# Patient Record
Sex: MALE | NOT HISPANIC OR LATINO | URBAN - METROPOLITAN AREA
[De-identification: names, ages, dates, MRNs, and addresses within clinical notes are randomized per-mention and may not be internally consistent; named-entity substitution may affect disease eponyms.]

---

## 2021-02-05 ENCOUNTER — COMMUNICATION - HEALTHEAST (OUTPATIENT)
Dept: CARDIOLOGY | Facility: CLINIC | Age: 61
End: 2021-02-05

## 2021-02-08 ENCOUNTER — OFFICE VISIT - HEALTHEAST (OUTPATIENT)
Dept: CARDIOLOGY | Facility: CLINIC | Age: 61
End: 2021-02-08

## 2021-02-08 DIAGNOSIS — R00.2 PALPITATIONS: ICD-10-CM

## 2021-02-08 DIAGNOSIS — I49.8 SINUS ARRHYTHMIA SEEN ON ELECTROCARDIOGRAM: ICD-10-CM

## 2021-02-08 RX ORDER — LISINOPRIL 10 MG/1
5 TABLET ORAL DAILY
Status: SHIPPED | COMMUNITY
Start: 2021-02-08

## 2021-02-08 ASSESSMENT — MIFFLIN-ST. JEOR: SCORE: 1715.72

## 2021-06-05 VITALS
SYSTOLIC BLOOD PRESSURE: 134 MMHG | HEART RATE: 82 BPM | WEIGHT: 197 LBS | DIASTOLIC BLOOD PRESSURE: 64 MMHG | HEIGHT: 71 IN | RESPIRATION RATE: 16 BRPM | BODY MASS INDEX: 27.58 KG/M2

## 2021-06-18 NOTE — PATIENT INSTRUCTIONS - HE
Patient Instructions by Lalito Sheehan MD at 2/8/2021  8:50 AM     Author: Lalito Sheehan MD Service: -- Author Type: Physician    Filed: 2/8/2021  9:28 AM Encounter Date: 2/8/2021 Status: Signed    : Lalito Sheehan MD (Physician)       It was a pleasure to meet with you today.      Below is a summary of your visit.   1. Set up [facility] so you can send me a copy of your echocardiogram from last December.  2. Monitor for continued symptoms. We don't need to do anything more at this time, but can evaluate further if your symptoms persist.  3. You can consider taking a magnesium supplement to help suppress extra heartbeats in your heart rhythm. Try Slow Mag, 1-2 tablets daily.  4. Follow up with me as needed with new or worsening symptoms.    Please do not hesitate to call the [facility] clinic with any questions or concerns at [phone]. You can also reach my nurse, Brianda, during normal business hours at [phone].    Sincerely,

## 2021-06-26 ENCOUNTER — HEALTH MAINTENANCE LETTER (OUTPATIENT)
Age: 61
End: 2021-06-26

## 2021-06-30 NOTE — PROGRESS NOTES
Progress Notes by Lalito Sheehan MD at 2/8/2021  8:50 AM     Author: Lalito Sheehan MD Service: -- Author Type: Physician    Filed: 2/8/2021  9:35 AM Encounter Date: 2/8/2021 Status: Signed    : Lalito Sheehan MD (Physician)           Thank you, DrKacie Provider, [facility], for asking the [facility] team to see Mr. Rj Delong to evaluate Palpitations .      Assessment/Recommendations   Assessment:    1. Palpitations - seems most likely awakening suddenly from sleep due to a benign cause such as a vivid dream. No high risk symptoms to warrant further evaluation.  2. Sinus arrhythmia - on ECG. May be triggering the slow pulse rate identified on his fitibit watch. Asymptomatic.    Plan:  1. Provided reassurance.   2. No further cardiac evaluation necessary at this time.  3. Consider magnesium supplementation.  4. Follow up as needed.         History of Present Illness   Mr. Rj Delong is a 60 y.o. male with a significant past history of hypertension who presents for evaluation of palpitations.    Mr. Delong has noticed for the past week or so that he would wake up suddenly from sleep needing to take a deep breath in her cast, and have his heart pounding.  His heart was not beating particularly fast which is beating hard.  He also noticed that his resting heart rate which is typically in the 70s had periodically been noted to be about 48 bpm by his Fitbit.  The awakenings at night would occur 1 or 2 times per night and lasted approximately 1 week.  He has not had any in the past 2 to 3 days.  He sought care in the emergency department out of concerns for a known underlying heart problem and evaluation in the ER was unremarkable with a normal chest x-ray, normal lab work including a BNP of 18 and negative troponins.  His EKG was notable for sinus rhythm with sinus arrhythmia or PACs.  He otherwise feels well without complaint.  He has adequate energy during the day and  feels well rested in the morning.  He has no exertional symptoms or limitations.      Other than noted above, Mr. Delong denies any chest pain/pressure/tightness, shortness of breath at rest or with exertion, light headedness/dizziness, pre-syncope, syncope, lower extremity swelling, palpitations, paroxysmal nocturnal dyspnea (PND), or orthopnea.     Cardiac Problems and Cardiac Diagnostics     Most Recent Cardiac testing:  ECG dated 2/4/2021 (personaly reviewed and interpreted): sinus rhythm with sinus arrhythmia    ECHO (report reviewed):   Reviewed a PDF of an echocardiogram performed in [place] on 1217 on the patient's smart phone.  The report indicated normal LV size and systolic function.  No segmental regional wall motion abnormalities.  Normal RV size and systolic function.  No significant valve disease.  No pericardial effusion.         Medications  Allergies   Current Outpatient Medications   Medication Sig Dispense Refill   ? lisinopriL (PRINIVIL,ZESTRIL) 10 MG tablet Take 5 mg by mouth daily.       No current facility-administered medications for this visit.       No Known Allergies     Physical Examination Review of Systems   Vitals:    02/08/21 0856   BP: 134/64   Pulse: 82   Resp: 16     Body mass index is 27.48 kg/m .  Wt Readings from Last 3 Encounters:   02/08/21 197 lb (89.4 kg)   02/04/21 191 lb (86.6 kg)       General Appearance:   Pleasant male, appears stated age. no acute distress, normal body habitus   ENT/Mouth: Wearing a mask     EYES:  no scleral icterus, normal conjunctivae   Neck: supple   Respiratory:   lungs are clear to auscultation, no rales or wheezing, equal chest wall expansion    Cardiovascular:   Regular rhythm, normal rate. Normal first and second heart sounds with no murmurs, rubs, or gallops; Jugular venous pressure normal, no edema bilaterally    Extremities: no cyanosis or clubbing   Skin: no xanthelasma, warm.    Heme/lymph/ Immunology No apparent bleeding noted.    Neurologic: Alert and oriented. normal gait, no tremors     Psychiatric: Pleasant, calm, appropriate affect.    A complete 10 system review of systems was performed and is negative except as mentioned in the HPI or below:  General: WNL  Eyes: WNL  Ears/Nose/Throat: WNL  Lungs: Shortness of Breath  Heart: Irregular Heartbeat  Stomach: WNL  Bladder: WNL  Muscle/Joints: WNL  Skin: WNL  Nervous System: WNL  Mental Health: WNL     Blood: WNL       Past History   Past Medical History:   Past Medical History:   Diagnosis Date   ? Hypertension        Past Surgical History: History reviewed. No pertinent surgical history.    Family History:   Family History   Problem Relation Age of Onset   ? Stroke Mother    ? Hypertension Mother    ? Hypertension Father    ? Hypertension Sister         Social History:   Social History     Socioeconomic History   ? Marital status:      Spouse name: Not on file   ? Number of children: Not on file   ? Years of education: Not on file   ? Highest education level: Not on file   Occupational History   ? Not on file   Social Needs   ? Financial resource strain: Not on file   ? Food insecurity     Worry: Not on file     Inability: Not on file   ? Transportation needs     Medical: Not on file     Non-medical: Not on file   Tobacco Use   ? Smoking status: Former Smoker   Substance and Sexual Activity   ? Alcohol use: Not on file   ? Drug use: Not on file   ? Sexual activity: Not on file   Lifestyle   ? Physical activity     Days per week: Not on file     Minutes per session: Not on file   ? Stress: Not on file   Relationships   ? Social connections     Talks on phone: Not on file     Gets together: Not on file     Attends Hoah[employer] service: Not on file     Active member of club or organization: Not on file     Attends meetings of clubs or organizations: Not on file     Relationship status: Not on file   ? Intimate partner violence     Fear of current or ex partner: Not on file      Emotionally abused: Not on file     Physically abused: Not on file     Forced sexual activity: Not on file   Other Topics Concern   ? Not on file   Social History Narrative    Patient is from [place]. He is visiting his son's family. Plans to stay until May 2021.              Lab Results    Chemistry/lipid CBC Cardiac Enzymes/BNP/TSH/INR   Lab Results   Component Value Date    CREATININE 0.98 02/04/2021    BUN 15 02/04/2021    K 4.2 02/04/2021     02/04/2021     02/04/2021    CO2 28 02/04/2021    Lab Results   Component Value Date    WBC 7.7 02/04/2021    HGB 15.8 02/04/2021    HCT 48.5 02/04/2021    MCV 90 02/04/2021     02/04/2021    Lab Results   Component Value Date    TROPONINI <0.01 02/04/2021    BNP 18 02/04/2021

## 2021-10-16 ENCOUNTER — HEALTH MAINTENANCE LETTER (OUTPATIENT)
Age: 61
End: 2021-10-16

## 2022-07-17 ENCOUNTER — HEALTH MAINTENANCE LETTER (OUTPATIENT)
Age: 62
End: 2022-07-17

## 2022-09-25 ENCOUNTER — HEALTH MAINTENANCE LETTER (OUTPATIENT)
Age: 62
End: 2022-09-25

## 2023-08-05 ENCOUNTER — HEALTH MAINTENANCE LETTER (OUTPATIENT)
Age: 63
End: 2023-08-05